# Patient Record
Sex: MALE | Race: WHITE | Employment: OTHER | URBAN - METROPOLITAN AREA
[De-identification: names, ages, dates, MRNs, and addresses within clinical notes are randomized per-mention and may not be internally consistent; named-entity substitution may affect disease eponyms.]

---

## 2019-11-30 ENCOUNTER — HOSPITAL ENCOUNTER (EMERGENCY)
Age: 84
Discharge: HOME | End: 2019-11-30
Attending: EMERGENCY MEDICINE
Payer: MEDICARE

## 2019-11-30 ENCOUNTER — APPOINTMENT (OUTPATIENT)
Dept: GENERAL RADIOLOGY | Age: 84
End: 2019-11-30
Payer: MEDICARE

## 2019-11-30 ENCOUNTER — APPOINTMENT (OUTPATIENT)
Dept: CT IMAGING | Age: 84
End: 2019-11-30
Payer: MEDICARE

## 2019-11-30 VITALS
OXYGEN SATURATION: 96 % | DIASTOLIC BLOOD PRESSURE: 53 MMHG | WEIGHT: 205 LBS | TEMPERATURE: 97.9 F | SYSTOLIC BLOOD PRESSURE: 113 MMHG | RESPIRATION RATE: 20 BRPM | HEIGHT: 69 IN | BODY MASS INDEX: 30.36 KG/M2 | HEART RATE: 62 BPM

## 2019-11-30 DIAGNOSIS — J18.9 PNEUMONIA DUE TO ORGANISM: Primary | ICD-10-CM

## 2019-11-30 DIAGNOSIS — R04.2 HEMOPTYSIS: ICD-10-CM

## 2019-11-30 LAB
ALBUMIN SERPL-MCNC: 3.6 GM/DL (ref 3.4–5)
ALP BLD-CCNC: 144 IU/L (ref 40–129)
ALT SERPL-CCNC: 14 U/L (ref 10–40)
ANION GAP SERPL CALCULATED.3IONS-SCNC: 12 MMOL/L (ref 4–16)
APTT: 38.1 SECONDS (ref 25.1–37.1)
AST SERPL-CCNC: 24 IU/L (ref 15–37)
BACTERIA: ABNORMAL /HPF
BASOPHILS ABSOLUTE: 0 K/CU MM
BASOPHILS RELATIVE PERCENT: 0.2 % (ref 0–1)
BILIRUB SERPL-MCNC: 1.9 MG/DL (ref 0–1)
BILIRUBIN URINE: NEGATIVE MG/DL
BLOOD, URINE: NEGATIVE
BUN BLDV-MCNC: 46 MG/DL (ref 6–23)
CALCIUM SERPL-MCNC: 8.9 MG/DL (ref 8.3–10.6)
CAST TYPE: ABNORMAL /HPF
CHLORIDE BLD-SCNC: 105 MMOL/L (ref 99–110)
CLARITY: CLEAR
CO2: 27 MMOL/L (ref 21–32)
COLOR: ABNORMAL
COMMENT UA: NEGATIVE
CREAT SERPL-MCNC: 1.5 MG/DL (ref 0.9–1.3)
CRYSTAL TYPE: ABNORMAL /HPF
DIFFERENTIAL TYPE: ABNORMAL
EOSINOPHILS ABSOLUTE: 0.1 K/CU MM
EOSINOPHILS RELATIVE PERCENT: 0.5 % (ref 0–3)
EPITHELIAL CELLS, UA: 1 /HPF
GFR AFRICAN AMERICAN: 54 ML/MIN/1.73M2
GFR NON-AFRICAN AMERICAN: 44 ML/MIN/1.73M2
GLUCOSE BLD-MCNC: 109 MG/DL (ref 70–99)
GLUCOSE, URINE: NEGATIVE MG/DL
HCT VFR BLD CALC: 35.9 % (ref 42–52)
HEMOGLOBIN: 11.3 GM/DL (ref 13.5–18)
IMMATURE NEUTROPHIL %: 0.5 % (ref 0–0.43)
INR BLD: 2.01 INDEX
KETONES, URINE: NEGATIVE MG/DL
LEUKOCYTE ESTERASE, URINE: NEGATIVE
LYMPHOCYTES ABSOLUTE: 1.3 K/CU MM
LYMPHOCYTES RELATIVE PERCENT: 10.3 % (ref 24–44)
MCH RBC QN AUTO: 34.3 PG (ref 27–31)
MCHC RBC AUTO-ENTMCNC: 31.5 % (ref 32–36)
MCV RBC AUTO: 109.1 FL (ref 78–100)
MONOCYTES ABSOLUTE: 0.9 K/CU MM
MONOCYTES RELATIVE PERCENT: 7.1 % (ref 0–4)
NITRITE URINE, QUANTITATIVE: NEGATIVE
PDW BLD-RTO: 16.3 % (ref 11.7–14.9)
PH, URINE: 6 (ref 5–8)
PLATELET # BLD: 156 K/CU MM (ref 140–440)
PMV BLD AUTO: 12.1 FL (ref 7.5–11.1)
POTASSIUM SERPL-SCNC: 4.1 MMOL/L (ref 3.5–5.1)
PROTEIN UA: NEGATIVE MG/DL
PROTHROMBIN TIME: 22.4 SECONDS (ref 11.7–14.5)
RBC # BLD: 3.29 M/CU MM (ref 4.6–6.2)
RBC URINE: NEGATIVE /HPF (ref 0–3)
SEGMENTED NEUTROPHILS ABSOLUTE COUNT: 9.8 K/CU MM
SEGMENTED NEUTROPHILS RELATIVE PERCENT: 81.4 % (ref 36–66)
SODIUM BLD-SCNC: 144 MMOL/L (ref 135–145)
SPECIFIC GRAVITY UA: 1.02 (ref 1–1.03)
TOTAL IMMATURE NEUTOROPHIL: 0.06 K/CU MM
TOTAL PROTEIN: 6.2 GM/DL (ref 6.4–8.2)
UROBILINOGEN, URINE: 0.2 MG/DL (ref 0.2–1)
WBC # BLD: 12.1 K/CU MM (ref 4–10.5)
WBC UA: 1 /HPF (ref 0–2)

## 2019-11-30 PROCEDURE — 87040 BLOOD CULTURE FOR BACTERIA: CPT

## 2019-11-30 PROCEDURE — 99283 EMERGENCY DEPT VISIT LOW MDM: CPT

## 2019-11-30 PROCEDURE — 80053 COMPREHEN METABOLIC PANEL: CPT

## 2019-11-30 PROCEDURE — 2580000003 HC RX 258: Performed by: EMERGENCY MEDICINE

## 2019-11-30 PROCEDURE — 85025 COMPLETE CBC W/AUTO DIFF WBC: CPT

## 2019-11-30 PROCEDURE — 85730 THROMBOPLASTIN TIME PARTIAL: CPT

## 2019-11-30 PROCEDURE — 81001 URINALYSIS AUTO W/SCOPE: CPT

## 2019-11-30 PROCEDURE — 6370000000 HC RX 637 (ALT 250 FOR IP): Performed by: EMERGENCY MEDICINE

## 2019-11-30 PROCEDURE — 71046 X-RAY EXAM CHEST 2 VIEWS: CPT

## 2019-11-30 PROCEDURE — 96365 THER/PROPH/DIAG IV INF INIT: CPT

## 2019-11-30 PROCEDURE — 85610 PROTHROMBIN TIME: CPT

## 2019-11-30 PROCEDURE — 6360000002 HC RX W HCPCS: Performed by: EMERGENCY MEDICINE

## 2019-11-30 RX ORDER — AZITHROMYCIN 250 MG/1
TABLET, FILM COATED ORAL
Qty: 1 PACKET | Refills: 0 | Status: SHIPPED | OUTPATIENT
Start: 2019-11-30

## 2019-11-30 RX ORDER — IPRATROPIUM BROMIDE AND ALBUTEROL SULFATE 2.5; .5 MG/3ML; MG/3ML
1 SOLUTION RESPIRATORY (INHALATION) ONCE
Status: COMPLETED | OUTPATIENT
Start: 2019-11-30 | End: 2019-11-30

## 2019-11-30 RX ADMIN — IPRATROPIUM BROMIDE AND ALBUTEROL SULFATE 1 AMPULE: .5; 3 SOLUTION RESPIRATORY (INHALATION) at 14:45

## 2019-11-30 RX ADMIN — CEFTRIAXONE SODIUM 1 G: 1 INJECTION, POWDER, FOR SOLUTION INTRAMUSCULAR; INTRAVENOUS at 16:01

## 2019-11-30 SDOH — HEALTH STABILITY: MENTAL HEALTH: HOW OFTEN DO YOU HAVE A DRINK CONTAINING ALCOHOL?: NEVER

## 2019-12-05 LAB
CULTURE: NORMAL
CULTURE: NORMAL
Lab: NORMAL
Lab: NORMAL
SPECIMEN: NORMAL
SPECIMEN: NORMAL

## 2021-03-05 ENCOUNTER — APPOINTMENT (OUTPATIENT)
Dept: GENERAL RADIOLOGY | Age: 86
End: 2021-03-05
Payer: MEDICARE

## 2021-03-05 ENCOUNTER — HOSPITAL ENCOUNTER (EMERGENCY)
Age: 86
Discharge: HOME | End: 2021-03-05
Attending: EMERGENCY MEDICINE
Payer: MEDICARE

## 2021-03-05 VITALS
SYSTOLIC BLOOD PRESSURE: 143 MMHG | HEART RATE: 67 BPM | RESPIRATION RATE: 17 BRPM | TEMPERATURE: 97.8 F | OXYGEN SATURATION: 99 % | DIASTOLIC BLOOD PRESSURE: 50 MMHG

## 2021-03-05 DIAGNOSIS — R60.0 LEG EDEMA: Primary | ICD-10-CM

## 2021-03-05 DIAGNOSIS — R23.8 BLISTERS OF MULTIPLE SITES: ICD-10-CM

## 2021-03-05 LAB
ALBUMIN SERPL-MCNC: 4.1 GM/DL (ref 3.4–5)
ALP BLD-CCNC: 141 IU/L (ref 40–129)
ALT SERPL-CCNC: 14 U/L (ref 10–40)
ANION GAP SERPL CALCULATED.3IONS-SCNC: 9 MMOL/L (ref 4–16)
AST SERPL-CCNC: 25 IU/L (ref 15–37)
BASOPHILS ABSOLUTE: 0 K/CU MM
BASOPHILS RELATIVE PERCENT: 0.7 % (ref 0–1)
BILIRUB SERPL-MCNC: 1.6 MG/DL (ref 0–1)
BUN BLDV-MCNC: 49 MG/DL (ref 6–23)
CALCIUM SERPL-MCNC: 9.3 MG/DL (ref 8.3–10.6)
CHLORIDE BLD-SCNC: 106 MMOL/L (ref 99–110)
CO2: 30 MMOL/L (ref 21–32)
CREAT SERPL-MCNC: 1.4 MG/DL (ref 0.9–1.3)
DIFFERENTIAL TYPE: ABNORMAL
EOSINOPHILS ABSOLUTE: 0.3 K/CU MM
EOSINOPHILS RELATIVE PERCENT: 4.3 % (ref 0–3)
GFR AFRICAN AMERICAN: 58 ML/MIN/1.73M2
GFR NON-AFRICAN AMERICAN: 48 ML/MIN/1.73M2
GLUCOSE BLD-MCNC: 95 MG/DL (ref 70–99)
HCT VFR BLD CALC: 38.8 % (ref 42–52)
HEMOGLOBIN: 12.2 GM/DL (ref 13.5–18)
IMMATURE NEUTROPHIL %: 0.5 % (ref 0–0.43)
LYMPHOCYTES ABSOLUTE: 1.7 K/CU MM
LYMPHOCYTES RELATIVE PERCENT: 27.6 % (ref 24–44)
MCH RBC QN AUTO: 34 PG (ref 27–31)
MCHC RBC AUTO-ENTMCNC: 31.4 % (ref 32–36)
MCV RBC AUTO: 108.1 FL (ref 78–100)
MONOCYTES ABSOLUTE: 0.7 K/CU MM
MONOCYTES RELATIVE PERCENT: 11.2 % (ref 0–4)
PDW BLD-RTO: 14.9 % (ref 11.7–14.9)
PLATELET # BLD: 151 K/CU MM (ref 140–440)
PMV BLD AUTO: 11.6 FL (ref 7.5–11.1)
POTASSIUM SERPL-SCNC: 4 MMOL/L (ref 3.5–5.1)
PRO-BNP: 5047 PG/ML
RBC # BLD: 3.59 M/CU MM (ref 4.6–6.2)
SEGMENTED NEUTROPHILS ABSOLUTE COUNT: 3.3 K/CU MM
SEGMENTED NEUTROPHILS RELATIVE PERCENT: 55.7 % (ref 36–66)
SODIUM BLD-SCNC: 145 MMOL/L (ref 135–145)
TOTAL IMMATURE NEUTOROPHIL: 0.03 K/CU MM
TOTAL PROTEIN: 6.8 GM/DL (ref 6.4–8.2)
TROPONIN T: 0.04 NG/ML
WBC # BLD: 6 K/CU MM (ref 4–10.5)

## 2021-03-05 PROCEDURE — 84484 ASSAY OF TROPONIN QUANT: CPT

## 2021-03-05 PROCEDURE — 99284 EMERGENCY DEPT VISIT MOD MDM: CPT

## 2021-03-05 PROCEDURE — 83880 ASSAY OF NATRIURETIC PEPTIDE: CPT

## 2021-03-05 PROCEDURE — 85025 COMPLETE CBC W/AUTO DIFF WBC: CPT

## 2021-03-05 PROCEDURE — 71045 X-RAY EXAM CHEST 1 VIEW: CPT

## 2021-03-05 PROCEDURE — 80053 COMPREHEN METABOLIC PANEL: CPT

## 2021-03-05 PROCEDURE — 93005 ELECTROCARDIOGRAM TRACING: CPT | Performed by: EMERGENCY MEDICINE

## 2021-03-05 RX ORDER — LEVOTHYROXINE SODIUM ANHYDROUS 100 UG/5ML
50 INJECTION, POWDER, LYOPHILIZED, FOR SOLUTION INTRAVENOUS DAILY
COMMUNITY

## 2021-03-05 RX ORDER — VALSARTAN 320 MG/1
320 TABLET ORAL DAILY
COMMUNITY

## 2021-03-05 RX ORDER — CEPHALEXIN 500 MG/1
500 CAPSULE ORAL 4 TIMES DAILY
Qty: 28 CAPSULE | Refills: 0 | Status: SHIPPED | OUTPATIENT
Start: 2021-03-05 | End: 2021-03-12

## 2021-03-05 RX ORDER — CARVEDILOL 12.5 MG/1
12.5 TABLET ORAL 2 TIMES DAILY WITH MEALS
COMMUNITY

## 2021-03-05 RX ORDER — WARFARIN SODIUM 4 MG/1
4 TABLET ORAL DAILY
COMMUNITY

## 2021-03-05 RX ORDER — FUROSEMIDE 40 MG/1
40 TABLET ORAL 2 TIMES DAILY
COMMUNITY

## 2021-03-05 RX ORDER — ALLOPURINOL 300 MG/1
300 TABLET ORAL DAILY
COMMUNITY

## 2021-03-05 RX ORDER — PRAVASTATIN SODIUM 40 MG
40 TABLET ORAL DAILY
COMMUNITY

## 2021-03-05 NOTE — ED NOTES
The patient is ambulatory to room 3 with c/o ROSS [place] edema. He states that he is more edematous than he normally is. Blisters noted to ROSS SILVESTRE. He states that he has CHF.      Mignon Hirsch RN  03/05/21 8254

## 2021-03-05 NOTE — ED NOTES
PIV started. Labs obtained from initial IV stick and taken to lab for processing.      Isabella Cuevas RN  03/05/21 7708

## 2021-03-05 NOTE — ED PROVIDER NOTES
Emergency Department Encounter    Patient: Giovani Ritter  MRN: [id number]  : 1933  Date of Evaluation: 3/5/2021  ED Provider:  Audi Guadalupe    Triage Chief Complaint:   Leg Swelling    [place]:  Giovani Ritter is a 80 y.o. male that presents with weeping fluid from his legs as well as blisters, they have developed over the last 3 to 4 days, he states his legs are always swollen and they are much more swollen, no redness or warmth, no pain, no trauma, no chest pain or shortness of breath, no orthopnea, he states otherwise he is doing fine. Symptoms are mild, constant, nothing makes them better or worse. ROS - see HPI, below listed is current ROS at time of my eval:  General:  No fevers, no chills  Eyes:  No recent vison changes, no discharge  ENT:  No sore throat, no nasal congestion  Cardiovascular:  No chest pain, no palpitations  Respiratory:  No shortness of breath, no cough  Gastrointestinal:  No pain, no nausea, no vomiting  Musculoskeletal:  No muscle pain, no joint pain  Skin:  No rash, no pruritis, no easy bruising, + blisters  Neurologic:  No speech problems, no headache  Genitourinary:  No dysuria, no hematuria  Endocrine:  No unexpected weight gain, no unexpected weight loss  Extremities:  + edema, no pain    Past Medical History:   Diagnosis Date    Arthritis     Atrial fibrillation ([facility] [address]     CAD (coronary artery disease)     CHF (congestive heart failure) (HCC)     COPD [facility]chronic obstructive pulmonary disease) ([facility])      Past Surgical History:   Procedure Laterality Date    JOINT REPLACEMENT       No family history on file.   Social History     Socioeconomic History    Marital status:      Spouse name: Not on file    Number of children: Not on file    Years of education: Not on file    Highest education level: Not on file   Occupational History    Not on file   Social Needs    Financial resource strain: Not on file    Food insecurity     Worry: Not on file Inability: Not on file    Transportation needs     Medical: Not on file     Non-medical: Not on file   Tobacco Use    Smoking status: Former Smoker    Smokeless tobacco: Never Used   Substance and Sexual Activity    Alcohol use: Not Currently     Frequency: Never    Drug use: Never    Sexual activity: Not Currently     Partners: Female   Lifestyle    Physical activity     Days per week: Not on file     Minutes per session: Not on file    Stress: Not on file   Relationships    Social connections     Talks on phone: Not on file     Gets together: Not on file     Attends Restorationist service: Not on file     Active member of club or organization: Not on file     Attends meetings of clubs or organizations: Not on file     Relationship status: Not on file    Intimate partner violence     Fear of current or ex partner: Not on file     Emotionally abused: Not on file     Physically abused: Not on file     Forced sexual activity: Not on file   Other Topics Concern    Not on file   Social History Narrative    Not on file     No current facility-administered medications for this encounter.       Current Outpatient Medications   Medication Sig Dispense Refill    warfarin (COUMADIN) 4 MG tablet Take 4 mg by mouth daily      furosemide (LASIX) 40 MG tablet Take 40 mg by mouth 2 times daily      allopurinol (ZYLOPRIM) 300 MG tablet Take 300 mg by mouth daily      carvedilol (COREG) 12.5 MG tablet Take 12.5 mg by mouth 2 times daily (with meals)      levothyroxine (SYNTHROID) 100 MCG injection Infuse 50 mcg intravenously Daily      valsartan (DIOVAN) 320 MG tablet Take 320 mg by mouth daily      fluticasone-salmeterol (ADVAIR) 100-50 MCG/DOSE diskus inhaler Inhale 1 puff into the lungs every 12 hours      pravastatin (PRAVACHOL) 40 MG tablet Take 40 mg by mouth daily      ipratropium (ATROVENT) 0.02 % nebulizer solution Take 0.5 mg by nebulization 4 times daily Monocytes % 11.2 (H) 0 - 4 %    Eosinophils % 4.3 (H) 0 - 3 %    Basophils % 0.7 0 - 1 %    Segs Absolute 3.3 K/CU MM    Lymphocytes Absolute 1.7 K/CU MM    Monocytes Absolute 0.7 K/CU MM    Eosinophils Absolute 0.3 K/CU MM    Basophils Absolute 0.0 K/CU MM    Immature Neutrophil % 0.5 (H) 0 - 0.43 %    Total Immature Neutrophil 0.03 K/CU MM   Comprehensive Metabolic Panel   Result Value Ref Range    Sodium 145 135 - 145 MMOL/L    Potassium 4.0 3.5 - 5.1 MMOL/L    Chloride 106 99 - 110 mMol/L    CO2 30 21 - 32 MMOL/L    BUN 49 (H) 6 - 23 MG/DL    CREATININE 1.4 (H) 0.9 - 1.3 MG/DL    Glucose 95 70 - 99 MG/DL    Calcium 9.3 8.3 - 10.6 MG/DL    Albumin 4.1 3.4 - 5.0 GM/DL    Total Protein 6.8 6.4 - 8.2 GM/DL    Total Bilirubin 1.6 (H) 0.0 - 1.0 MG/DL    ALT 14 10 - 40 U/L    AST 25 15 - 37 IU/L    Alkaline Phosphatase 141 (H) 40 - 129 IU/L    GFR Non- 48 (L) >60 mL/min/1.73m2    GFR  58 (L) >60 mL/min/1.73m2    Anion Gap 9 4 - 16   Troponin   Result Value Ref Range    Troponin T 0.036 (H) <0.01 NG/ML   EKG 12 Lead   Result Value Ref Range    Ventricular Rate 65 BPM    Atrial Rate 441 BPM    QRS Duration 142 ms    Q-T Interval 460 ms    QTc Calculation (Bazett) 478 ms    R Axis -20 degrees    T Axis -16 degrees    Diagnosis       Atrial fibrillation  Right bundle branch block  Abnormal ECG  No previous ECGs available        Radiographs (if obtained):  Radiologist's Report Reviewed:  No results found.     EKG (if obtained): (All EKG's are interpreted by myself in the absence of a cardiologist)  EKG shows atrial fibrillation rate of 65 no old EKGs available for comparison    MDM: Patient here with lower extremity swelling and blisters, not in a florid CHF exacerbation but may be slightly fluid overloaded causing lower extremity swelling which is leading to blistering, there is no signs of infection, labs sent, EKG does not have any definitive ST elevation, patient's blisters were dressed, there is no signs of infection but I am going to place him on antibiotics due to his age and comorbid conditions, his BNP is just above 5000, chest x-ray does not have florid pulmonary edema and only shows chronic changes, his troponin is slightly up but he does not complain of any anginal equivalents, I am going to increase his Lasix for the weekend, give him antibiotics, have him follow-up closely on Monday if he cannot he needs to return to the emergency department he understands and agrees return warnings given. Clinical Impression:  1. Leg edema      Disposition referral (if applicable):  No follow-up provider specified. Disposition medications (if applicable):  New Prescriptions    No medications on file     ED Provider Disposition Time  DISPOSITION        Comment: Please note this report has been produced using speech recognition software and may contain errors related to that system including errors in grammar, punctuation, and spelling, as well as words and phrases that may be inappropriate. Efforts were made to edit the dictations.         Inés Gautam MD  03/05/21 0191       Inés Gautam MD  03/05/21 1906

## 2021-03-06 NOTE — ED NOTES
The patient was discharged to home in stable condition. All discharge instructions, medications and follow up appointments were reviewed with the patient and or significant other. Any and all concerns if voiced were addressed. The patient was instructed to return for worsening symptoms and any other concerns.       oRcio Begum RN  03/05/21 1927

## 2021-03-06 NOTE — PROGRESS NOTES
Patient leg wrapped with non adherent and rolled guaze, patient educated on wound care. AVS reviewed with patient all questions and concerns addressed, Patient educated on Med change and to follow up with his provider.

## 2021-03-07 PROCEDURE — 93010 ELECTROCARDIOGRAM REPORT: CPT | Performed by: INTERNAL MEDICINE

## 2021-03-09 LAB
EKG ATRIAL RATE: 441 BPM
EKG DIAGNOSIS: NORMAL
EKG Q-T INTERVAL: 460 MS
EKG QRS DURATION: 142 MS
EKG QTC CALCULATION (BAZETT): 478 MS
EKG R AXIS: -20 DEGREES
EKG T AXIS: -16 DEGREES
EKG VENTRICULAR RATE: 65 BPM

## 2021-03-22 ENCOUNTER — HOSPITAL ENCOUNTER (OUTPATIENT)
Dept: NEUROLOGY | Age: 86
Discharge: HOME | End: 2021-03-22
Payer: MEDICARE

## 2021-03-22 PROCEDURE — 95911 NRV CNDJ TEST 9-10 STUDIES: CPT | Performed by: PHYSICAL MEDICINE & REHABILITATION

## 2021-03-22 PROCEDURE — 95886 MUSC TEST DONE W/N TEST COMP: CPT

## 2021-03-22 PROCEDURE — 95911 NRV CNDJ TEST 9-10 STUDIES: CPT

## 2021-03-22 PROCEDURE — 95886 MUSC TEST DONE W/N TEST COMP: CPT | Performed by: PHYSICAL MEDICINE & REHABILITATION

## 2021-03-22 NOTE — PROCEDURES
Risks and benefits of study discussed. Specific and common risks of pain and bleeding, as well as uncommon side effects of infection, hematoma, vasovagal episodes. Patient agreeable to testing and consents to such. Clinical: Hand numbness and weakness for many years. Previous carpal tunnel surgery on the left without significant improvement. Motor NCS:  Median response absent on the right, moderate to severely depressed on the left; latency on the left is moderately prolonged and velocity is mildly slow  Ulnar amplitudes, latencies, velocities normal bilateral    Sensory NCS:  Median responses absent bilateral  Ulnar amplitudes and latencies normal bilateral  Right radial amplitude and latency normal    Needle EMG:  Severe/complete denervation of right thenar muscles, and moderate axon loss in the left thenar    Impression:    #1 severe median neuropathies bilaterally. Right side is complete, without convincing evidence of axonal continuity. Left side demonstrates moderate motor axonal continuity, but not sensory fiber    #2  No evidence to suggest additional peripheral neurogenic lesion or process such as cervical radiculopathy, brachial plexopathy, generalized peripheral neuropathy.

## 2023-05-09 ENCOUNTER — HOSPITAL ENCOUNTER (OUTPATIENT)
Dept: WOUND CARE | Age: 88
Discharge: HOME | End: 2023-05-09
Payer: MEDICARE

## 2023-05-09 VITALS
DIASTOLIC BLOOD PRESSURE: 83 MMHG | RESPIRATION RATE: 16 BRPM | SYSTOLIC BLOOD PRESSURE: 172 MMHG | HEART RATE: 67 BPM | TEMPERATURE: 98.4 F

## 2023-05-09 DIAGNOSIS — R60.0 BILATERAL LOWER EXTREMITY EDEMA: ICD-10-CM

## 2023-05-09 DIAGNOSIS — L97.222 VENOUS STASIS ULCER OF LEFT CALF WITH FAT LAYER EXPOSED WITH VARICOSE VEINS (HCC): ICD-10-CM

## 2023-05-09 DIAGNOSIS — I83.022 VENOUS STASIS ULCER OF LEFT CALF WITH FAT LAYER EXPOSED WITH VARICOSE VEINS (HCC): ICD-10-CM

## 2023-05-09 DIAGNOSIS — L97.801 VENOUS STASIS ULCER OF OTHER PART OF LOWER LEG LIMITED TO BREAKDOWN OF SKIN WITHOUT VARICOSE VEINS, UNSPECIFIED LATERALITY (HCC): ICD-10-CM

## 2023-05-09 DIAGNOSIS — L97.212 VENOUS STASIS ULCER OF RIGHT CALF WITH FAT LAYER EXPOSED WITH VARICOSE VEINS (HCC): ICD-10-CM

## 2023-05-09 DIAGNOSIS — I87.2 VENOUS STASIS ULCER OF OTHER PART OF LOWER LEG LIMITED TO BREAKDOWN OF SKIN WITHOUT VARICOSE VEINS, UNSPECIFIED LATERALITY (HCC): ICD-10-CM

## 2023-05-09 DIAGNOSIS — I83.012 VENOUS STASIS ULCER OF RIGHT CALF WITH FAT LAYER EXPOSED WITH VARICOSE VEINS (HCC): ICD-10-CM

## 2023-05-09 PROBLEM — L97.901 VENOUS STASIS ULCER LIMITED TO BREAKDOWN OF SKIN WITHOUT VARICOSE VEINS (HCC): Status: ACTIVE | Noted: 2023-05-09

## 2023-05-09 PROCEDURE — 99213 OFFICE O/P EST LOW 20 MIN: CPT

## 2023-05-09 PROCEDURE — 11045 DBRDMT SUBQ TISS EACH ADDL: CPT

## 2023-05-09 PROCEDURE — 11042 DBRDMT SUBQ TIS 1ST 20SQCM/<: CPT

## 2023-05-09 NOTE — DISCHARGE INSTRUCTIONS
PHYSICIAN ORDERS AND DISCHARGE INSTRUCTIONS    NOTE: Upon discharge from the [address], you will receive a patient experience survey. We would be grateful if you would take the time to fill this survey out. Wound care order history:     MANUELA's   Right       Left    Date:   Cultures:     Grafts:     Antibiotics:        Continuing wound care orders and information:                Residence:                Continue home health care with:    Your wound-care supplies will be provided by: Wound cleansing:     Do not scrub or use excessive force. Wash hands with soap and water before and after dressing changes. Prior to applying a clean dressing, cleanse wound with normal saline, wound cleanser, or mild soap and water. Ask the physician or nurse before getting the wound(s) wet in a shower. General Wound management:   Keep weight off wounds and reposition every 2 hours. Avoid standing for long periods of time. Apply wraps/stockings in AM and remove at bedtime. If swelling is present, elevate legs to the level of the heart or above for 30 minutes 4-5 times a day and/or when sitting. When taking antibiotics take entire prescription as ordered by physician do not stop taking until medicine is all gone. Increase protein intake to promote healing. Orders for this week: 5/9/23    FAX ORDERS TO [facility]! -home care to order Coban 2 wraps and other supplies    Rx:    Bilateral Anterior Lower Leg Wounds - Wash with soap and water, pat dry. Apply Anasept and Stimulen to wound beds. Cover with Sorbex (or ABD). Wrap with Coban 2. Leave in place until Friday's nurse visit. Be sure to keep dry. Juxtalites ordered for left leg 5/9/23  Heel to knee measurement: 44cm      Nurse visit on Friday to check and reapply Coban 2 wraps. Follow up with Dr Neema Rivera in 1 week(s) in the wound care center. Call [phone] for any questions or concerns.   Date__________   Time____________

## 2023-05-09 NOTE — PROGRESS NOTES
Multilayer Compression Wrap   (Not Unna) Below the Knee    NAME:  Dash Amaya  YOB: 1933  MEDICAL RECORD NUMBER:  [id number]  DATE:  5/9/2023    Multilayer compression wrap: Removed old Multilayer wrap if indicated and wash leg with mild soap/water. Applied moisturizing agent to dry skin as needed. Applied primary and secondary dressing as ordered. Applied multilayered dressing below the knee to right lower leg. Applied multilayered dressing below the knee to left lower leg. Instructed patient/caregiver not to remove dressing and to keep it clean and dry. Instructed patient/caregiver on complications to report to provider, such as pain, numbness in toes, heavy drainage, and slippage of dressing. Instructed patient on purpose of compression dressing and on activity and exercise recommendations.       Electronically signed by Delfino Weeks RN on 5/9/2023 at 12:07 PM

## 2023-05-09 NOTE — PROGRESS NOTES
Compression [address] for Compression Stockings:     [employer] [id number] [address] p: [phone] f: [phone]     Ordering Center:     Georgie Arambula [address] [place] [address] John Mir[facility] [id number] [phone]  WOUND CARE Dept: [phone]   FAX NUMBER [phone]    Patient Information:      Wolfgang Nearing  [address] [place] [id number]   [phone]   : 1933  AGE: 80 y.o. GENDER: male   TODAYS DATE:  2023    Insurance:      PRIMARY INSURANCE:  Plan: [facility] MEDICARE COMPLETE  Coverage: [facility] MEDICARE  Effective Date: 2021  Group Number: [unfilled]  Subscriber Number: [id number] - (Medicare Managed)    Payer/Plan Subscr  Sex Relation Sub. Ins. ID Effective Group Num   1.  [facility] MEDICARE Mitch Ape 1933 Male Self [id number]                                   [address] [address]       Patient Information:      Problem List Items Addressed This Visit          Circulatory    Venous stasis ulcer limited to breakdown of skin without varicose veins (HCC)       Wound 23 Pretibial Right #1 (Active)   Wound Image   23 1138   Wound Etiology Venous 23 1138   Wound Cleansed Wound cleanser 23 1138   Wound Length (cm) 3 cm 23 1138   Wound Width (cm) 4 cm 23 1138   Wound Depth (cm) 0.1 cm 23 1138   Wound Surface Area (cm^2) 12 cm^2 23 1138   Wound Volume (cm^3) 1.2 cm^3 23 1138   Post-Procedure Length (cm) 3 cm 23 1158   Post-Procedure Width (cm) 4 cm 23 1158   Post-Procedure Depth (cm) 0.1 cm 23 1158   Post-Procedure Surface Area (cm^2) 12 cm^2 23 1158   Post-Procedure Volume (cm^3) 1.2 cm^3 23 1158   Distance Tunneling (cm) 0 cm 23 1138   Tunneling Position ___ O'Clock 0 23 1138   Undermining Starts ___ O'Clock 0 23 1138   Undermining Ends___ O'Clock 0 23 1138   Undermining Maxium Distance (cm) 0 23 1138

## 2023-05-09 NOTE — H&P
[address] [place] [address]  AGE: 80 y.o. GENDER: male  : 1933  EPISODE DATE:  2023   Referred by:PCP    Subjective:     CHIEF COMPLAINT  WOUND   Chief Complaint   Patient presents with    Wound Check        HISTORY of PRESENT ILLNESS      Dash Amaya is a 80 y.o. male who presents to the [address] [place] [address] for an initial visit for evaluation and treatment of Acute on chronic venous  ulcer(s) of  R lower leg anterior, L lower leg anterior. The condition is of moderate severity. The ulcer has been worse over last 2 days. The underlying cause is thought to be venous insufficiency. The patients care to date has included compression. The patient has significant underlying medical conditions as below. Wound Pain Timing/Severity: none  Quality of pain: N/A  Severity of pain:  0 / 10   Modifying Factors: edema and venous stasis  Associated Signs/Symptoms: drainage and numbness    MANUELA: as indicated base on wound location and assessment    Wound infection: wound culture will be obtained as needed for symptoms of infection     Arterial evaluation: if indicated based on wound, location, symptoms and healing    Venous Evaluation: if indicated based on wound, location, symptoms and healing       Smoking: former smokerr  Patient educated on the 6 essential components necessary for wound healing: Circulation, Debridements, Proper Dressings and Topical Wound Products, Infection Control, Edema Control and Offloading. Patient educated on those factors that negatively effect or impact wound healing: smoking, obesity, uncontrolled diabetes, anticoagulant and immunosuppressive regimens, inadequate nutrition, untreated arterial and venous disease if applicable and measures to manage edema.            Skin Care  Keep skin clean and well moisturized , moisturize routinely with ointments for heavier moisturizer needs for extremely dry skin or cracks such as A&D ointment and lotions for a light

## 2023-05-12 ENCOUNTER — HOSPITAL ENCOUNTER (OUTPATIENT)
Dept: WOUND CARE | Age: 88
Discharge: HOME | End: 2023-05-12
Payer: MEDICARE

## 2023-05-12 PROBLEM — L97.212 VENOUS STASIS ULCER OF RIGHT CALF WITH FAT LAYER EXPOSED WITH VARICOSE VEINS (HCC): Status: ACTIVE | Noted: 2023-05-12

## 2023-05-12 PROBLEM — L97.901 VENOUS STASIS ULCER LIMITED TO BREAKDOWN OF SKIN WITHOUT VARICOSE VEINS (HCC): Status: RESOLVED | Noted: 2023-05-09 | Resolved: 2023-05-12

## 2023-05-12 PROBLEM — L97.222 VENOUS STASIS ULCER OF LEFT CALF WITH FAT LAYER EXPOSED WITH VARICOSE VEINS (HCC): Status: ACTIVE | Noted: 2023-05-12

## 2023-05-12 PROBLEM — I87.2 VENOUS STASIS ULCER LIMITED TO BREAKDOWN OF SKIN WITHOUT VARICOSE VEINS (HCC): Status: RESOLVED | Noted: 2023-05-09 | Resolved: 2023-05-12

## 2023-05-12 PROBLEM — I83.022 VENOUS STASIS ULCER OF LEFT CALF WITH FAT LAYER EXPOSED WITH VARICOSE VEINS (HCC): Status: ACTIVE | Noted: 2023-05-12

## 2023-05-12 PROBLEM — R60.0 BILATERAL LOWER EXTREMITY EDEMA: Status: ACTIVE | Noted: 2023-05-12

## 2023-05-12 PROBLEM — I83.012 VENOUS STASIS ULCER OF RIGHT CALF WITH FAT LAYER EXPOSED WITH VARICOSE VEINS (HCC): Status: ACTIVE | Noted: 2023-05-12

## 2023-05-12 PROCEDURE — 29581 APPL MULTLAYER CMPRN SYS LEG: CPT

## 2023-05-12 RX ORDER — CLOBETASOL PROPIONATE 0.5 MG/G
OINTMENT TOPICAL ONCE
OUTPATIENT
Start: 2023-05-12 | End: 2023-05-12

## 2023-05-12 RX ORDER — LIDOCAINE 40 MG/G
CREAM TOPICAL ONCE
OUTPATIENT
Start: 2023-05-12 | End: 2023-05-12

## 2023-05-12 RX ORDER — LIDOCAINE 50 MG/G
OINTMENT TOPICAL ONCE
OUTPATIENT
Start: 2023-05-12 | End: 2023-05-12

## 2023-05-12 RX ORDER — BACITRACIN, NEOMYCIN, POLYMYXIN B 400; 3.5; 5 [USP'U]/G; MG/G; [USP'U]/G
OINTMENT TOPICAL ONCE
OUTPATIENT
Start: 2023-05-12 | End: 2023-05-12

## 2023-05-12 RX ORDER — LIDOCAINE HYDROCHLORIDE 40 MG/ML
SOLUTION TOPICAL ONCE
OUTPATIENT
Start: 2023-05-12 | End: 2023-05-12

## 2023-05-12 RX ORDER — BACITRACIN ZINC AND POLYMYXIN B SULFATE 500; 1000 [USP'U]/G; [USP'U]/G
OINTMENT TOPICAL ONCE
OUTPATIENT
Start: 2023-05-12 | End: 2023-05-12

## 2023-05-12 RX ORDER — BETAMETHASONE DIPROPIONATE 0.05 %
OINTMENT (GRAM) TOPICAL ONCE
OUTPATIENT
Start: 2023-05-12 | End: 2023-05-12

## 2023-05-12 RX ORDER — GINSENG 100 MG
CAPSULE ORAL ONCE
OUTPATIENT
Start: 2023-05-12 | End: 2023-05-12

## 2023-05-12 RX ORDER — GENTAMICIN SULFATE 1 MG/G
OINTMENT TOPICAL ONCE
OUTPATIENT
Start: 2023-05-12 | End: 2023-05-12

## 2023-05-12 RX ORDER — SODIUM CHLOR/HYPOCHLOROUS ACID 0.033 %
SOLUTION, IRRIGATION IRRIGATION ONCE
OUTPATIENT
Start: 2023-05-12 | End: 2023-05-12

## 2023-05-12 NOTE — WOUND CARE
Multilayer Compression Wrap   (Not Unna) Below the Knee    NAME:  Cordell Yu  YOB: 1933  MEDICAL RECORD NUMBER:  [id number]  DATE:  5/12/2023    Multilayer compression wrap: Removed old Multilayer wrap if indicated and wash leg with mild soap/water. Applied moisturizing agent to dry skin as needed. Applied primary and secondary dressing as ordered. Applied multilayered dressing below the knee to right lower leg. Applied multilayered dressing below the knee to left lower leg. Instructed patient/caregiver not to remove dressing and to keep it clean and dry. Instructed patient/caregiver on complications to report to provider, such as pain, numbness in toes, heavy drainage, and slippage of dressing. Instructed patient on purpose of compression dressing and on activity and exercise recommendations.       Electronically signed by Hesham Santos RN on 5/12/2023 at 3:41 PM